# Patient Record
Sex: FEMALE | Race: BLACK OR AFRICAN AMERICAN | NOT HISPANIC OR LATINO
[De-identification: names, ages, dates, MRNs, and addresses within clinical notes are randomized per-mention and may not be internally consistent; named-entity substitution may affect disease eponyms.]

---

## 2021-09-09 ENCOUNTER — RESULT REVIEW (OUTPATIENT)
Age: 46
End: 2021-09-09

## 2022-09-14 PROBLEM — Z00.00 ENCOUNTER FOR PREVENTIVE HEALTH EXAMINATION: Status: ACTIVE | Noted: 2022-09-14

## 2022-09-15 ENCOUNTER — APPOINTMENT (OUTPATIENT)
Dept: ORTHOPEDIC SURGERY | Facility: CLINIC | Age: 47
End: 2022-09-15

## 2022-09-15 DIAGNOSIS — M32.9 SYSTEMIC LUPUS ERYTHEMATOSUS, UNSPECIFIED: ICD-10-CM

## 2022-09-15 DIAGNOSIS — Z86.79 PERSONAL HISTORY OF OTHER DISEASES OF THE CIRCULATORY SYSTEM: ICD-10-CM

## 2022-09-15 PROCEDURE — 99214 OFFICE O/P EST MOD 30 MIN: CPT

## 2022-09-15 RX ORDER — METOPROLOL TARTRATE 25 MG/1
25 TABLET, FILM COATED ORAL
Refills: 0 | Status: ACTIVE | COMMUNITY

## 2022-09-15 RX ORDER — ASPIRIN 325 MG/1
TABLET, FILM COATED ORAL
Refills: 0 | Status: ACTIVE | COMMUNITY

## 2022-09-15 RX ORDER — DAPSONE 25 MG/1
TABLET ORAL
Refills: 0 | Status: ACTIVE | COMMUNITY

## 2022-09-15 RX ORDER — PANTOPRAZOLE SODIUM 100 %
POWDER (GRAM) MISCELLANEOUS
Refills: 0 | Status: ACTIVE | COMMUNITY

## 2022-09-15 RX ORDER — RAMIPRIL 10 MG/1
10 CAPSULE ORAL
Refills: 0 | Status: ACTIVE | COMMUNITY

## 2022-09-15 RX ORDER — METHYLPREDNISOLONE 4 MG/1
4 TABLET ORAL
Qty: 1 | Refills: 0 | Status: ACTIVE | COMMUNITY
Start: 2022-09-15 | End: 1900-01-01

## 2022-09-15 NOTE — ASSESSMENT
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  The natural history of the pathology was explained to the patient in layman's terms.  Several different treatment options were discussed and explained including the risks and benefits of both surgical and non-surgical treatments.  All questions and concerns were answered.\par - We will conservative treatment with a course of PT and anti-inflammatory medication.\par - Rx given for Medrol dose pack\par - Discussed the possible side effects of medication along with the timing and frequency for taking.\par - MRI to rule out HNP and other causes of cervical radiculopathy\par - Follow up after mri with spine team\par

## 2022-09-15 NOTE — HISTORY OF PRESENT ILLNESS
[de-identified] : 47 year old female  ((RHD, social security ) chronic neck into right arm and shoulder pain worsening since Feb 2022. pain assoc with numbness a`nd tingling and weakness into hand. worse with activity and better with lifting arm. tried OTC meds, activity modficiaon, HEP.\par \par h/o CTS\par h/o lupus\par \par 9/15/22 - sent MDP and to get c spine mri and fu with spine back in march\par \par

## 2022-09-15 NOTE — IMAGING
[de-identified] : NECK:\par Inspection: no ecchymosis. \par Palpation: trapezial tenderness. \par Range of motion:  Full range of motion with mild stiffness . Pain at extremes of rotation to right. \par Strength Testing: Weakness with Right Finger Abductors and Grasp\par Normal Deltoid, Biceps, Triceps, Wrist Flexors\par Neurological testing: light touch is intact throughout both upper extremities\par Louis reflex: neg\par Spurling test: positive\par \par \par \par RIGHT SHOULDER\par Inspection: No swelling. \par Palpation: Tenderness is noted at the bicipital groove, anterior and lateral. \par Range of motion: There is pain with range of motion.\par , ER 55, @90ER 90, @90IR 30\par Strength: There is pain and discomfort with strength testing.\par Forward Flexion 4/5. Abduction 4/5.  External Rotation 5-/5 and Internal Rotation 5/5 \par Neurological testings: motor and sensor intact distally.\par Ligament Stability and Special Tests: \par There is positive arc of pain. \par Shoulder apprehension: neg\par Shoulder relocation: neg\par Egan’s test: pos\par Biceps Active test: neg\par Granado Labral Shear: neg\par Impingement testing: pos\par Heidy testing: pos\par Whipple: pos\par Cross Body Adduction: neg\par \par

## 2022-10-03 ENCOUNTER — APPOINTMENT (OUTPATIENT)
Dept: ORTHOPEDIC SURGERY | Facility: CLINIC | Age: 47
End: 2022-10-03

## 2022-10-03 PROCEDURE — 99214 OFFICE O/P EST MOD 30 MIN: CPT

## 2022-10-03 NOTE — DATA REVIEWED
[MRI] : MRI [Cervical Spine] : cervical spine [Report was reviewed and noted in the chart] : The report was reviewed and noted in the chart [I independently reviewed and interpreted images and report] : I independently reviewed and interpreted images and report [I reviewed the films/CD] : I reviewed the films/CD

## 2022-10-03 NOTE — ASSESSMENT
[FreeTextEntry1] : 47f with cervical radicular symptoms with HNP C3-4, C4-5 and C5-6 on MRI. Will get her started on physical therapy and recommended a consult with pain management for epidural injections.

## 2022-10-03 NOTE — HISTORY OF PRESENT ILLNESS
[de-identified] : 47 year old female  ((RHD, social security ) chronic neck into right arm and shoulder pain worsening since Feb 2022. pain assoc with numbness a`nd tingling and weakness into hand. worse with activity and better with lifting arm. tried OTC meds, activity modficiaon, HEP.\par \par h/o CTS\par h/o lupus\par \par 9/15/22 - sent MDP and to get c spine mri and fu with spine back in march\par \par 10/3/22: 46 y/o F sent by Dr. Banks for further evaluation of neck pain with right arm radiculopathy. Recently treated with MDP with partial relief. Had MRI stand up march 2022 that revealed mulitple hnps with R c5/C6 compression.  [FreeTextEntry1] : C Spine

## 2022-10-03 NOTE — HISTORY OF PRESENT ILLNESS
[de-identified] : 47 year old female  ((RHD, social security ) chronic neck into right arm and shoulder pain worsening since Feb 2022. pain assoc with numbness a`nd tingling and weakness into hand. worse with activity and better with lifting arm. tried OTC meds, activity modficiaon, HEP.\par \par h/o CTS\par h/o lupus\par \par 9/15/22 - sent MDP and to get c spine mri and fu with spine back in march\par \par 10/3/22: 46 y/o F sent by Dr. Banks for further evaluation of neck pain with right arm radiculopathy. Recently treated with MDP with partial relief. Had MRI stand up march 2022 that revealed mulitple hnps with R c5/C6 compression.  [FreeTextEntry1] : C Spine

## 2022-10-10 ENCOUNTER — APPOINTMENT (OUTPATIENT)
Dept: ORTHOPEDIC SURGERY | Facility: CLINIC | Age: 47
End: 2022-10-10

## 2022-10-13 ENCOUNTER — APPOINTMENT (OUTPATIENT)
Dept: PAIN MANAGEMENT | Facility: CLINIC | Age: 47
End: 2022-10-13

## 2022-10-13 VITALS — HEIGHT: 67 IN | WEIGHT: 203 LBS | BODY MASS INDEX: 31.86 KG/M2

## 2022-10-13 PROCEDURE — 99204 OFFICE O/P NEW MOD 45 MIN: CPT

## 2022-10-13 NOTE — REASON FOR VISIT
[Initial Consultation] : an initial pain management consultation [FreeTextEntry2] : neck pain radiating to right shoulder

## 2022-10-13 NOTE — CONSULT LETTER
[Dear  ___] : Dear  [unfilled], [Consult Letter:] : I had the pleasure of evaluating your patient, [unfilled]. [Please see my note below.] : Please see my note below. [Consult Closing:] : Thank you very much for allowing me to participate in the care of this patient.  If you have any questions, please do not hesitate to contact me. [Sincerely,] : Sincerely, [FreeTextEntry3] : Jean Paul Schaefer MD\par \par

## 2022-10-13 NOTE — HISTORY OF PRESENT ILLNESS
[Neck] : neck [10] : 10 [Dull/Aching] : dull/aching [Radiating] : radiating [Tightness] : tightness [Tingling] : tingling [Constant] : constant [Household chores] : household chores [Sleep] : sleep [Nothing helps with pain getting better] : Nothing helps with pain getting better [Sitting] : sitting [Standing] : standing [Walking] : walking [Bending forward] : bending forward [Exercising] : exercising [Stairs] : stairs [Lying in bed] : lying in bed [] : no [FreeTextEntry1] : rt shoulder  [FreeTextEntry6] : pins and needles  [FreeTextEntry7] : rt shoulders , arms and hands  [de-identified] : c mri

## 2022-10-13 NOTE — DISCUSSION/SUMMARY
[Surgical risks reviewed] : Surgical risks reviewed [de-identified] : After discussing various treatment options with the patient including but not limited to oral medications, physical therapy, exercise,\par modalities as well as interventional spinal injections, we have decided with the following plan\par \par I personally reviewed the MRI/CT scan images and agree with the radiologist's report. The\par radiological findings were discussed with the patient.\par \par \par The risks, benefits, contents and alternatives to injection were explained in full to the patient. Risks outlined include but are not\par limited to infection,sepsis, bleeding, post-dural puncture headache, nerve damage, temporary increase in pain, syncopal episode,\par failure to resolve symptoms, allergic reaction, symptom recurrence, and elevation of blood sugar in diabetics. Cortisone may cause\par immunosuppression. Patient understands the risks. All questions were answered. After discussion of options, patient requested\par an injection. Information regarding the injection was given to the patient. Which medications to stop prior to the injection was\par explained to the patient as well.\par \par Follow up in 1-2 weeks post injection for re-evaluation.\par \par  Conservative Care\par Continue Home exercises, stretching, activity modification, physical therapy, and conservative care.\par \par Proceed with C7- T1 RANI\par \par \par Proceed with C7-T1 RANI.

## 2022-11-07 ENCOUNTER — APPOINTMENT (OUTPATIENT)
Dept: ORTHOPEDIC SURGERY | Facility: CLINIC | Age: 47
End: 2022-11-07

## 2022-11-28 ENCOUNTER — APPOINTMENT (OUTPATIENT)
Age: 47
End: 2022-11-28

## 2022-11-28 PROCEDURE — 62321 NJX INTERLAMINAR CRV/THRC: CPT

## 2022-11-29 LAB — SARS-COV-2 N GENE NPH QL NAA+PROBE: NOT DETECTED

## 2022-12-15 ENCOUNTER — APPOINTMENT (OUTPATIENT)
Dept: PAIN MANAGEMENT | Facility: CLINIC | Age: 47
End: 2022-12-15

## 2022-12-15 VITALS — HEIGHT: 67 IN | WEIGHT: 203 LBS | BODY MASS INDEX: 31.86 KG/M2

## 2022-12-15 DIAGNOSIS — M54.12 RADICULOPATHY, CERVICAL REGION: ICD-10-CM

## 2022-12-15 DIAGNOSIS — M54.2 CERVICALGIA: ICD-10-CM

## 2022-12-15 PROCEDURE — 99214 OFFICE O/P EST MOD 30 MIN: CPT

## 2022-12-15 NOTE — ASSESSMENT
[FreeTextEntry1] : s/p C7-T1 RANI\par \par Pt reports 50-60% relief from injection #1, most of her relief is in RUE, R shoulder improved but some pain persists. Will proceed with RANI #2 and followup prn. Continue HEP.

## 2022-12-15 NOTE — DISCUSSION/SUMMARY
[Surgical risks reviewed] : Surgical risks reviewed [de-identified] : After discussing various treatment options with the patient including but not limited to oral medications, physical therapy, exercise,\par modalities as well as interventional spinal injections, we have decided with the following plan\par \par I personally reviewed the MRI/CT scan images and agree with the radiologist's report. The\par radiological findings were discussed with the patient.\par \par \par The risks, benefits, contents and alternatives to injection were explained in full to the patient. Risks outlined include but are not\par limited to infection,sepsis, bleeding, post-dural puncture headache, nerve damage, temporary increase in pain, syncopal episode,\par failure to resolve symptoms, allergic reaction, symptom recurrence, and elevation of blood sugar in diabetics. Cortisone may cause\par immunosuppression. Patient understands the risks. All questions were answered. After discussion of options, patient requested\par an injection. Information regarding the injection was given to the patient. Which medications to stop prior to the injection was\par explained to the patient as well.\par \par Follow up in 1-2 weeks post injection for re-evaluation.\par \par  Conservative Care\par Continue Home exercises, stretching, activity modification, physical therapy, and conservative care.\par \par \par \par Proceed with C7-T1 RANI. \par

## 2022-12-15 NOTE — HISTORY OF PRESENT ILLNESS
[5] : 5 [Tingling] : tingling [Constant] : constant [Nothing helps with pain getting better] : Nothing helps with pain getting better [de-identified] : Patient presents for follow up after RANI. Patient reports significant relief from the procedure, tingling in right shoulder still persists. [] : no [FreeTextEntry1] : Right shoulder pain [de-identified] : Nothing makes the pain any worse [FreeTextEntry7] : No longer radiating down full length of arm

## 2023-01-10 ENCOUNTER — APPOINTMENT (OUTPATIENT)
Dept: ORTHOPEDIC SURGERY | Facility: CLINIC | Age: 48
End: 2023-01-10
Payer: COMMERCIAL

## 2023-01-10 VITALS — HEIGHT: 67 IN | BODY MASS INDEX: 31.86 KG/M2 | WEIGHT: 203 LBS

## 2023-01-10 VITALS — BODY MASS INDEX: 30.45 KG/M2 | WEIGHT: 194 LBS | HEIGHT: 67 IN

## 2023-01-10 DIAGNOSIS — M76.891 OTHER SPECIFIED ENTHESOPATHIES OF RIGHT LOWER LIMB, EXCLUDING FOOT: ICD-10-CM

## 2023-01-10 PROCEDURE — 72100 X-RAY EXAM L-S SPINE 2/3 VWS: CPT

## 2023-01-10 PROCEDURE — 99214 OFFICE O/P EST MOD 30 MIN: CPT

## 2023-01-10 PROCEDURE — 72170 X-RAY EXAM OF PELVIS: CPT

## 2023-01-10 NOTE — ASSESSMENT
[FreeTextEntry1] : 47 year F with right hip flexor tendonitis\par - physical therapy, cannot take NSAIDs\par - Return in 6 weeks for follow up PRN

## 2023-01-10 NOTE — HISTORY OF PRESENT ILLNESS
[7] : 7 [6] : 6 [Dull/Aching] : dull/aching [Radiating] : radiating [Constant] : constant [Sleep] : sleep [Meds] : meds [Standing] : standing [Walking] : walking [Full time] : Work status: full time [de-identified] : 01/10/2023 Ms. JESSE VACA F  here for eval of the Rt hip. Patient stated her right hip has been giving her trouble for about two months; she was given a cortisone shot; which she stated did not help. Patient states the pain travels into the groin. No known injury. No pain down the leg. No numbness or tingling. Has not done PT.  Cannot take NSAIDs due to gastric sleeve sx. Takes tylenol arthritis for pain. \par \par PMH: Lupus on plaquenil  [] : no [FreeTextEntry1] : right hip  [FreeTextEntry7] : right groin  [FreeTextEntry9] : Tylenol

## 2023-01-13 ENCOUNTER — APPOINTMENT (OUTPATIENT)
Dept: PAIN MANAGEMENT | Facility: CLINIC | Age: 48
End: 2023-01-13
Payer: COMMERCIAL

## 2023-01-13 PROCEDURE — 62321 NJX INTERLAMINAR CRV/THRC: CPT

## 2023-01-13 NOTE — PROCEDURE
[FreeTextEntry3] : Date of Service: 01/13/2023 \par \par Account: [id number] \par \par Patient: JESSE VACA \par \par YOB: 1975 \par \par Age: 47 year \par \par Surgeon:                                                      Jean Paul Schaefer M.D.\par \par Pre-Operative Diagnosis:                         Cervical Radiculopathy (M54.12) \par \par Post Operative Diagnosis:                       Cervical Radiculopathy (M54.12)\par \par Procedure:                                                  Interlaminar cervical epidural steroid injection (C7-T1) under fluoroscopic guidance\par \par Anesthesia:                                                 MAC\par \par \par This procedure was carried out using fluoroscopic guidance.  The risks and benefits of the procedure were discussed extensively with the patient.  The consent of the patient was obtained and the following procedure was performed.\par \par The patient was placed in the prone position using a thoracic and chin support.  The cervical area was prepped and draped in a sterile fashion.  The fluoroscope visualized the C7-T1 interspace using slight cephalad-caudad angulation and this area was marked.  Using sterile technique the superficial skin was anesthetized with 1% Lidocaine without epinephrine.  A 18 gauge Tuohoy needle was advanced under fluoroscopy using [id number] technique until ligament was engaged.  The stilette was then removed and a column of preservative free normal saline flushed throught the tuohoy needle and left with a concave fluid level above the butterfly portion of the tuohoy needle.  The needle was then advanced under fluoroscopic guidance until the column of saline disappeared.  Lateral view confirmed final needle tip placement in the epidural space.  After negative aspiration for heme and CSF, 1 cc of Omnipaque confirmed good cervical epiduragram.  \par \par Cervical epidurogram showed no evidence of intrathecal or intravascular flow, and good bilateral epidural flow from C3 to T2 levels.  An injectate of 6 cc of preservative free normal saline plus 12 mg of betamethasone was then injected into the epidural space. The needle was subsequently removed and pressure was applied.\par \par Anesthesia personnel were present throughout the procedure.  The patient tolerated the procedure well and was instructed to contact me immediately if there were any problems.\par \par Jean Paul Schaefer M.D.\par

## 2023-01-26 ENCOUNTER — APPOINTMENT (OUTPATIENT)
Dept: PAIN MANAGEMENT | Facility: CLINIC | Age: 48
End: 2023-01-26

## 2024-02-15 ENCOUNTER — APPOINTMENT (OUTPATIENT)
Dept: ORTHOPEDIC SURGERY | Facility: CLINIC | Age: 49
End: 2024-02-15
Payer: COMMERCIAL

## 2024-02-15 VITALS — HEIGHT: 67 IN | BODY MASS INDEX: 30.45 KG/M2 | WEIGHT: 194 LBS

## 2024-02-15 DIAGNOSIS — M54.12 RADICULOPATHY, CERVICAL REGION: ICD-10-CM

## 2024-02-15 DIAGNOSIS — M50.20 OTHER CERVICAL DISC DISPLACEMENT, UNSPECIFIED CERVICAL REGION: ICD-10-CM

## 2024-02-15 DIAGNOSIS — M50.90 CERVICAL DISC DISORDER, UNSPECIFIED, UNSPECIFIED CERVICAL REGION: ICD-10-CM

## 2024-02-15 DIAGNOSIS — M75.41 IMPINGEMENT SYNDROME OF RIGHT SHOULDER: ICD-10-CM

## 2024-02-15 PROCEDURE — 73030 X-RAY EXAM OF SHOULDER: CPT | Mod: RT

## 2024-02-15 PROCEDURE — 99214 OFFICE O/P EST MOD 30 MIN: CPT

## 2024-02-15 PROCEDURE — 72040 X-RAY EXAM NECK SPINE 2-3 VW: CPT

## 2024-02-15 PROCEDURE — 73010 X-RAY EXAM OF SHOULDER BLADE: CPT | Mod: RT

## 2024-02-15 RX ORDER — METHYLPREDNISOLONE 4 MG/1
4 TABLET ORAL
Qty: 1 | Refills: 0 | Status: ACTIVE | COMMUNITY
Start: 2024-02-15 | End: 1900-01-01

## 2024-02-15 NOTE — ASSESSMENT
[FreeTextEntry1] : mri c spine stand up 3/22/22 - mult HNP with R C5 and C6 N compresison  Right X-Ray Examination of the SHOULDER (2 views):  no fractures, subluxations or dislocations.  X-Ray Examination of the SCAPULA 1 or 2 views shows: no significant abnormalities X-Ray Examination of the CERVICAL SPINE 3 views (or less) shows: straightening consistent with spasm and disc space narrowing.   - We discussed their diagnosis and treatment options at length including the risks and benefits of both surgical and non-surgical options. - We will conservative treatment with a course of PT and anti-inflammatory medication. - Rx given for Medrol dose pack - Discussed the possible side effects of medication along with the timing and frequency for taking. - If they do not make an appropriate improvement with therapy we discussed that we will obtain an updated MRI at their next visit.

## 2024-02-15 NOTE — HISTORY OF PRESENT ILLNESS
[de-identified] : 48 year old female  ((RHD, social security ) chronic neck into right arm and shoulder pain worsening since Feb 2022. pain assoc with numbness a`nd tingling and weakness into hand. worse with activity and better with lifting arm. tried OTC meds, activity modficiaon, HEP.  h/o CTS h/o lupus  9/15/22 - sent MDP and to get c spine mri and fu with spine back in march  2/15/24- pain returns in her neck and shoulder worsening  radition into arm and shouldert blade and arm

## 2024-02-15 NOTE — IMAGING
[de-identified] : NECK: Inspection: no ecchymosis.  Palpation: trapezial tenderness.  Range of motion:  Full range of motion with mild stiffness . Pain at extremes of rotation to right.  Strength Testing: Weakness with Right Finger Abductors and Grasp Normal Deltoid, Biceps, Triceps, Wrist Flexors Neurological testing: light touch is intact throughout both upper extremities Louis reflex: neg Spurling test: positive    RIGHT SHOULDER Inspection: No swelling.  Palpation: Tenderness is noted at the bicipital groove, anterior and lateral.  Range of motion: There is pain with range of motion. , ER 55, @90ER 90, @90IR 30 Strength: There is pain and discomfort with strength testing. Forward Flexion 4/5. Abduction 4/5.  External Rotation 5-/5 and Internal Rotation 5/5  Neurological testings: motor and sensor intact distally. Ligament Stability and Special Tests:  There is positive arc of pain.  Shoulder apprehension: neg Shoulder relocation: neg Egan's test: pos Biceps Active test: neg Granado Labral Shear: neg Impingement testing: pos Heidy testing: pos Whipple: pos Cross Body Adduction: neg

## 2024-07-14 PROBLEM — S93.601S FOOT SPRAIN, RIGHT, SEQUELA: Status: ACTIVE | Noted: 2024-07-14

## 2024-07-15 ENCOUNTER — APPOINTMENT (OUTPATIENT)
Dept: MRI IMAGING | Facility: CLINIC | Age: 49
End: 2024-07-15
Payer: COMMERCIAL

## 2024-07-15 PROCEDURE — 73718 MRI LOWER EXTREMITY W/O DYE: CPT | Mod: RT

## 2024-07-16 ENCOUNTER — TRANSCRIPTION ENCOUNTER (OUTPATIENT)
Age: 49
End: 2024-07-16

## 2024-07-16 ENCOUNTER — APPOINTMENT (OUTPATIENT)
Dept: ORTHOPEDIC SURGERY | Facility: CLINIC | Age: 49
End: 2024-07-16
Payer: COMMERCIAL

## 2024-07-16 VITALS — HEIGHT: 67 IN | WEIGHT: 162 LBS | BODY MASS INDEX: 25.43 KG/M2

## 2024-07-16 DIAGNOSIS — Z00.00 ENCOUNTER FOR GENERAL ADULT MEDICAL EXAMINATION W/OUT ABNORMAL FINDINGS: ICD-10-CM

## 2024-07-16 DIAGNOSIS — S93.321A: ICD-10-CM

## 2024-07-16 PROCEDURE — 99204 OFFICE O/P NEW MOD 45 MIN: CPT

## 2024-07-16 PROCEDURE — 73630 X-RAY EXAM OF FOOT: CPT | Mod: RT

## 2024-07-17 ENCOUNTER — APPOINTMENT (OUTPATIENT)
Dept: ORTHOPEDIC SURGERY | Facility: CLINIC | Age: 49
End: 2024-07-17

## 2024-07-25 ENCOUNTER — TRANSCRIPTION ENCOUNTER (OUTPATIENT)
Age: 49
End: 2024-07-25

## 2024-07-26 ENCOUNTER — APPOINTMENT (OUTPATIENT)
Dept: ORTHOPEDIC SURGERY | Facility: HOSPITAL | Age: 49
End: 2024-07-26
Payer: COMMERCIAL

## 2024-07-26 ENCOUNTER — TRANSCRIPTION ENCOUNTER (OUTPATIENT)
Age: 49
End: 2024-07-26

## 2024-07-26 PROCEDURE — 73630 X-RAY EXAM OF FOOT: CPT | Mod: 26,RT

## 2024-07-26 PROCEDURE — 28615 REPAIR FOOT DISLOCATION: CPT | Mod: RT

## 2024-07-26 PROCEDURE — 28555 REPAIR FOOT DISLOCATION: CPT | Mod: 59,RT

## 2024-07-26 PROCEDURE — 29515 APPLICATION SHORT LEG SPLINT: CPT | Mod: 59,RT

## 2024-08-05 ENCOUNTER — APPOINTMENT (OUTPATIENT)
Dept: ORTHOPEDIC SURGERY | Facility: CLINIC | Age: 49
End: 2024-08-05

## 2024-08-05 PROBLEM — Z87.81 PERSONAL HISTORY OF (HEALED) TRAUMATIC FRACTURE: Chronic | Status: ACTIVE | Noted: 2024-07-22

## 2024-08-05 PROBLEM — J30.2 OTHER SEASONAL ALLERGIC RHINITIS: Chronic | Status: ACTIVE | Noted: 2024-07-22

## 2024-08-05 PROBLEM — I25.10 ATHEROSCLEROTIC HEART DISEASE OF NATIVE CORONARY ARTERY WITHOUT ANGINA PECTORIS: Chronic | Status: ACTIVE | Noted: 2024-07-22

## 2024-08-05 PROBLEM — M06.9 RHEUMATOID ARTHRITIS, UNSPECIFIED: Chronic | Status: ACTIVE | Noted: 2024-07-22

## 2024-08-05 PROBLEM — J45.909 UNSPECIFIED ASTHMA, UNCOMPLICATED: Chronic | Status: ACTIVE | Noted: 2024-07-22

## 2024-08-05 PROBLEM — N80.9 ENDOMETRIOSIS, UNSPECIFIED: Chronic | Status: ACTIVE | Noted: 2024-07-22

## 2024-08-05 PROBLEM — Q24.5 MALFORMATION OF CORONARY VESSELS: Chronic | Status: ACTIVE | Noted: 2024-07-22

## 2024-08-05 PROBLEM — M32.9 SYSTEMIC LUPUS ERYTHEMATOSUS, UNSPECIFIED: Chronic | Status: ACTIVE | Noted: 2024-07-22

## 2024-08-05 PROBLEM — U07.1 COVID-19: Chronic | Status: ACTIVE | Noted: 2024-07-22

## 2024-08-05 PROCEDURE — 99024 POSTOP FOLLOW-UP VISIT: CPT

## 2024-08-05 NOTE — HISTORY OF PRESENT ILLNESS
[7] : 7 [2] : 2 [Dull/Aching] : dull/aching [Sharp] : sharp [Throbbing] : throbbing [de-identified] : 07/16/2024:  fall on cruise 6 days ago  with foot pain.  placed in splint by ship personnel. went to [facility] and given boot and sent for mri. prior brostrum by dr solis about 10 years -- denies post op issues. denies dm/tob. +SLE on plaquenil. h/o cabg on asa 81. .  07/26/2024:  R foot ORIF Lisfranc  08/05/2024:  no complaints. nwb in splint. taking asa. Patient denies fevers, chills, or drainage. [] : no [FreeTextEntry1] : right foot [de-identified] : boot [de-identified] : [facility] [de-identified] : xr, MRI

## 2024-08-05 NOTE — PHYSICAL EXAM
[Right] : right foot and ankle [5___] : plantar flexion 5[unfilled]/5 [2+] : dorsalis pedis pulse: 2+ [] : Sensation present to light touch in all distributions [de-identified] : rollator

## 2024-08-19 ENCOUNTER — APPOINTMENT (OUTPATIENT)
Dept: ORTHOPEDIC SURGERY | Facility: CLINIC | Age: 49
End: 2024-08-19
Payer: COMMERCIAL

## 2024-08-19 DIAGNOSIS — S93.321D: ICD-10-CM

## 2024-08-19 PROCEDURE — 99024 POSTOP FOLLOW-UP VISIT: CPT

## 2024-08-19 PROCEDURE — 73630 X-RAY EXAM OF FOOT: CPT | Mod: RT

## 2024-08-19 NOTE — HISTORY OF PRESENT ILLNESS
[7] : 7 [2] : 2 [Dull/Aching] : dull/aching [Sharp] : sharp [Throbbing] : throbbing [de-identified] : 07/16/2024:  fall on cruise 6 days ago  with foot pain.  placed in splint by ship personnel. went to [facility] and given boot and sent for mri. prior brostrum by dr solis about 10 years -- denies post op issues. denies dm/tob. +SLE on plaquenil. h/o cabg on asa 81. .  07/26/2024:  R foot ORIF Lisfranc  08/05/2024:  no complaints. nwb in splint. taking asa. Patient denies fevers, chills, or drainage.  8/19/2024: no new issues. taking asa. reports walking on foot at times. Patient denies fevers, chills, or drainage. [] : no [FreeTextEntry1] : right foot [de-identified] : boot [de-identified] : [facility] [de-identified] : xr, MRI

## 2024-08-19 NOTE — PHYSICAL EXAM
[Right] : right foot and ankle [5___] : plantar flexion 5[unfilled]/5 [2+] : dorsalis pedis pulse: 2+ [] : no calf tenderness [de-identified] : rollator

## 2024-08-19 NOTE — ASSESSMENT
[FreeTextEntry1] : nwb--reiterated precautions. discussed if any further displacement due to weight bearing, would need to consider revision. patient states understands that foot should not touch floor cam boot discussed wound care f/up 2 wks w/ foot xray

## 2024-09-05 ENCOUNTER — APPOINTMENT (OUTPATIENT)
Dept: ORTHOPEDIC SURGERY | Facility: CLINIC | Age: 49
End: 2024-09-05
Payer: COMMERCIAL

## 2024-09-05 DIAGNOSIS — S93.321D: ICD-10-CM

## 2024-09-05 PROCEDURE — 73630 X-RAY EXAM OF FOOT: CPT | Mod: RT

## 2024-09-05 PROCEDURE — 99024 POSTOP FOLLOW-UP VISIT: CPT

## 2024-09-05 NOTE — ASSESSMENT
[FreeTextEntry1] : nwb--reiterated precautions. discussed if any further displacement due to weight bearing, would need to consider revision. patient states understands that foot should not touch floor -- no further displacement from last visit cam boot f/up 2 wks w/ foot xray

## 2024-09-05 NOTE — IMAGING
[Right] : right foot [de-identified] : some backing out of screws w/ mild recurrent displacement--unchanged from prior

## 2024-09-05 NOTE — PHYSICAL EXAM
[Right] : right foot and ankle [5___] : plantar flexion 5[unfilled]/5 [2+] : dorsalis pedis pulse: 2+ [] : no calf tenderness [de-identified] : rollator

## 2024-09-05 NOTE — HISTORY OF PRESENT ILLNESS
[7] : 7 [2] : 2 [Dull/Aching] : dull/aching [Sharp] : sharp [Throbbing] : throbbing [de-identified] : 07/16/2024:  fall on cruise 6 days ago  with foot pain.  placed in splint by ship personnel. went to [facility] and given boot and sent for mri. prior brostrum by dr solis about 10 years -- denies post op issues. denies dm/tob. +SLE on plaquenil. h/o cabg on asa 81. .  07/26/2024:  R foot ORIF Lisfranc  08/05/2024:  no complaints. nwb in splint. taking asa. Patient denies fevers, chills, or drainage.  8/19/2024: no new issues. taking asa. reports walking on foot at times. Patient denies fevers, chills, or drainage.  09/05/2024: improving. NWB in boot w/ scooter. Patient denies fevers, chills, or drainage. [] : no [FreeTextEntry1] : right foot [de-identified] : boot [de-identified] : [facility] [de-identified] : xr, MRI

## 2024-09-23 ENCOUNTER — APPOINTMENT (OUTPATIENT)
Dept: ORTHOPEDIC SURGERY | Facility: CLINIC | Age: 49
End: 2024-09-23
Payer: COMMERCIAL

## 2024-09-23 DIAGNOSIS — S93.321D: ICD-10-CM

## 2024-09-23 PROCEDURE — 73630 X-RAY EXAM OF FOOT: CPT | Mod: RT

## 2024-09-23 PROCEDURE — 99024 POSTOP FOLLOW-UP VISIT: CPT

## 2024-09-23 NOTE — PHYSICAL EXAM
[Right] : right foot and ankle [5___] : plantar flexion 5[unfilled]/5 [2+] : dorsalis pedis pulse: 2+ [] : no sero-sanguinous drainage [de-identified] : rollator

## 2024-09-23 NOTE — IMAGING
[Right] : right foot [de-identified] : some backing out of screws w/ mild recurrent displacement--unchanged from prior

## 2024-09-23 NOTE — HISTORY OF PRESENT ILLNESS
[7] : 7 [2] : 2 [Dull/Aching] : dull/aching [Sharp] : sharp [Throbbing] : throbbing [de-identified] : 07/16/2024:  fall on cruise 6 days ago  with foot pain.  placed in splint by ship personnel. went to [facility] and given boot and sent for mri. prior brostrum by dr solis about 10 years -- denies post op issues. denies dm/tob. +SLE on plaquenil. h/o cabg on asa 81. .  07/26/2024:  R foot ORIF Lisfranc  08/05/2024:  no complaints. nwb in splint. taking asa. Patient denies fevers, chills, or drainage.  8/19/2024: no new issues. taking asa. reports walking on foot at times. Patient denies fevers, chills, or drainage.  09/05/2024: improving. NWB in boot w/ scooter. Patient denies fevers, chills, or drainage.  09/23/2024:  some pain at end of day. nwb in boot. Patient denies fevers, chills, or drainage. [] : no [FreeTextEntry1] : right foot [de-identified] : boot [de-identified] : [facility] [de-identified] : xr, MRI

## 2024-09-23 NOTE — ASSESSMENT
[FreeTextEntry1] : protected wb in cam boot may begin to partial wb in boot ice/elevate nsaids prn f/up 3 wks w/ foot xray

## 2024-10-14 ENCOUNTER — APPOINTMENT (OUTPATIENT)
Dept: ORTHOPEDIC SURGERY | Facility: CLINIC | Age: 49
End: 2024-10-14
Payer: COMMERCIAL

## 2024-10-14 DIAGNOSIS — S93.321D: ICD-10-CM

## 2024-10-14 PROCEDURE — 73630 X-RAY EXAM OF FOOT: CPT | Mod: RT

## 2024-10-14 PROCEDURE — 99024 POSTOP FOLLOW-UP VISIT: CPT

## 2024-11-11 ENCOUNTER — APPOINTMENT (OUTPATIENT)
Dept: ORTHOPEDIC SURGERY | Facility: CLINIC | Age: 49
End: 2024-11-11
Payer: COMMERCIAL

## 2024-11-11 DIAGNOSIS — S93.321D: ICD-10-CM

## 2024-11-11 PROCEDURE — 73630 X-RAY EXAM OF FOOT: CPT | Mod: RT

## 2024-11-11 PROCEDURE — 99213 OFFICE O/P EST LOW 20 MIN: CPT

## 2024-12-16 ENCOUNTER — APPOINTMENT (OUTPATIENT)
Dept: ORTHOPEDIC SURGERY | Facility: CLINIC | Age: 49
End: 2024-12-16
Payer: COMMERCIAL

## 2024-12-16 DIAGNOSIS — S93.321D: ICD-10-CM

## 2024-12-16 PROCEDURE — 73630 X-RAY EXAM OF FOOT: CPT | Mod: RT

## 2024-12-16 PROCEDURE — 99214 OFFICE O/P EST MOD 30 MIN: CPT

## 2024-12-23 PROBLEM — R56.9 UNSPECIFIED CONVULSIONS: Chronic | Status: ACTIVE | Noted: 2024-07-22

## 2025-01-09 ENCOUNTER — OUTPATIENT (OUTPATIENT)
Dept: OUTPATIENT SERVICES | Facility: HOSPITAL | Age: 50
LOS: 1 days | End: 2025-01-09
Payer: COMMERCIAL

## 2025-01-09 VITALS
HEART RATE: 72 BPM | SYSTOLIC BLOOD PRESSURE: 133 MMHG | DIASTOLIC BLOOD PRESSURE: 84 MMHG | OXYGEN SATURATION: 98 % | TEMPERATURE: 98 F | HEIGHT: 66 IN | RESPIRATION RATE: 14 BRPM | WEIGHT: 169.98 LBS

## 2025-01-09 DIAGNOSIS — S93.321D SUBLUXATION OF TARSOMETATARSAL JOINT OF RIGHT FOOT, SUBSEQUENT ENCOUNTER: ICD-10-CM

## 2025-01-09 DIAGNOSIS — Z98.84 BARIATRIC SURGERY STATUS: Chronic | ICD-10-CM

## 2025-01-09 DIAGNOSIS — M50.20 OTHER CERVICAL DISC DISPLACEMENT, UNSPECIFIED CERVICAL REGION: Chronic | ICD-10-CM

## 2025-01-09 DIAGNOSIS — Z82.49 FAMILY HISTORY OF ISCHEMIC HEART DISEASE AND OTHER DISEASES OF THE CIRCULATORY SYSTEM: Chronic | ICD-10-CM

## 2025-01-09 DIAGNOSIS — M79.671 PAIN IN RIGHT FOOT: ICD-10-CM

## 2025-01-09 DIAGNOSIS — Z96.22 MYRINGOTOMY TUBE(S) STATUS: Chronic | ICD-10-CM

## 2025-01-09 DIAGNOSIS — Z90.89 ACQUIRED ABSENCE OF OTHER ORGANS: Chronic | ICD-10-CM

## 2025-01-09 DIAGNOSIS — Z98.890 OTHER SPECIFIED POSTPROCEDURAL STATES: Chronic | ICD-10-CM

## 2025-01-09 DIAGNOSIS — D64.9 ANEMIA, UNSPECIFIED: Chronic | ICD-10-CM

## 2025-01-09 DIAGNOSIS — Z87.74 PERSONAL HISTORY OF (CORRECTED) CONGENITAL MALFORMATIONS OF HEART AND CIRCULATORY SYSTEM: Chronic | ICD-10-CM

## 2025-01-09 DIAGNOSIS — Z92.241 PERSONAL HISTORY OF SYSTEMIC STEROID THERAPY: Chronic | ICD-10-CM

## 2025-01-09 DIAGNOSIS — Z01.818 ENCOUNTER FOR OTHER PREPROCEDURAL EXAMINATION: ICD-10-CM

## 2025-01-09 DIAGNOSIS — Z98.891 HISTORY OF UTERINE SCAR FROM PREVIOUS SURGERY: Chronic | ICD-10-CM

## 2025-01-09 PROBLEM — R56.9 UNSPECIFIED CONVULSIONS: Chronic | Status: INACTIVE | Noted: 2024-07-22 | Resolved: 2025-01-09

## 2025-01-09 LAB
ALBUMIN SERPL ELPH-MCNC: 3.1 G/DL — LOW (ref 3.3–5)
ALP SERPL-CCNC: 49 U/L — SIGNIFICANT CHANGE UP (ref 30–120)
ALT FLD-CCNC: 13 U/L — SIGNIFICANT CHANGE UP (ref 10–60)
ANION GAP SERPL CALC-SCNC: 6 MMOL/L — SIGNIFICANT CHANGE UP (ref 5–17)
AST SERPL-CCNC: 17 U/L — SIGNIFICANT CHANGE UP (ref 10–40)
BILIRUB SERPL-MCNC: 0.4 MG/DL — SIGNIFICANT CHANGE UP (ref 0.2–1.2)
BUN SERPL-MCNC: 9 MG/DL — SIGNIFICANT CHANGE UP (ref 7–23)
CALCIUM SERPL-MCNC: 8.9 MG/DL — SIGNIFICANT CHANGE UP (ref 8.4–10.5)
CHLORIDE SERPL-SCNC: 107 MMOL/L — SIGNIFICANT CHANGE UP (ref 96–108)
CO2 SERPL-SCNC: 29 MMOL/L — SIGNIFICANT CHANGE UP (ref 22–31)
CREAT SERPL-MCNC: 0.67 MG/DL — SIGNIFICANT CHANGE UP (ref 0.5–1.3)
EGFR: 107 ML/MIN/1.73M2 — SIGNIFICANT CHANGE UP
GLUCOSE SERPL-MCNC: 86 MG/DL — SIGNIFICANT CHANGE UP (ref 70–99)
HCT VFR BLD CALC: 34.6 % — SIGNIFICANT CHANGE UP (ref 34.5–45)
HGB BLD-MCNC: 10.9 G/DL — LOW (ref 11.5–15.5)
MCHC RBC-ENTMCNC: 23.7 PG — LOW (ref 27–34)
MCHC RBC-ENTMCNC: 31.5 G/DL — LOW (ref 32–36)
MCV RBC AUTO: 75.2 FL — LOW (ref 80–100)
NRBC # BLD: 0 /100 WBCS — SIGNIFICANT CHANGE UP (ref 0–0)
PLATELET # BLD AUTO: 217 K/UL — SIGNIFICANT CHANGE UP (ref 150–400)
POTASSIUM SERPL-MCNC: 4.3 MMOL/L — SIGNIFICANT CHANGE UP (ref 3.5–5.3)
POTASSIUM SERPL-SCNC: 4.3 MMOL/L — SIGNIFICANT CHANGE UP (ref 3.5–5.3)
PROT SERPL-MCNC: 7.3 G/DL — SIGNIFICANT CHANGE UP (ref 6–8.3)
RBC # BLD: 4.6 M/UL — SIGNIFICANT CHANGE UP (ref 3.8–5.2)
RBC # FLD: 14.6 % — HIGH (ref 10.3–14.5)
SODIUM SERPL-SCNC: 142 MMOL/L — SIGNIFICANT CHANGE UP (ref 135–145)
WBC # BLD: 2.21 K/UL — LOW (ref 3.8–10.5)
WBC # FLD AUTO: 2.21 K/UL — LOW (ref 3.8–10.5)

## 2025-01-09 PROCEDURE — 36415 COLL VENOUS BLD VENIPUNCTURE: CPT

## 2025-01-09 PROCEDURE — 85027 COMPLETE CBC AUTOMATED: CPT

## 2025-01-09 PROCEDURE — G0463: CPT

## 2025-01-09 PROCEDURE — 80053 COMPREHEN METABOLIC PANEL: CPT

## 2025-01-09 NOTE — H&P PST ADULT - NSICDXPROCEDURE_GEN_ALL_CORE_FT
PROCEDURES:  Removal of hardware from metatarsal bone of right foot 09-Jan-2025 07:19:30  Salma Araujo

## 2025-01-09 NOTE — H&P PST ADULT - NSICDXFAMILYHX_GEN_ALL_CORE_FT
FAMILY HISTORY:  Mother  Still living? No  Family history of lymphoma, Age at diagnosis: Age Unknown  FH: melanoma, Age at diagnosis: Age Unknown

## 2025-01-09 NOTE — H&P PST ADULT - NSICDXPASTMEDICALHX_GEN_ALL_CORE_FT
PAST MEDICAL HISTORY:  2019 novel coronavirus disease (COVID-19)     Asthma     Bursitis of right shoulder     CAD (coronary artery disease)     Coronary artery anomaly, congenital     COVID-19 vaccine series completed     Endometriosis     Fracture of right foot     History of COVID-19     History of foot fracture     History of seizure     Migraines     Rheumatoid arthritis     Seasonal allergies     Systemic lupus erythematosus     Vertigo

## 2025-01-09 NOTE — H&P PST ADULT - NSANTHOSAYNRD_GEN_A_CORE
neck 13 inches/No. ONEAL screening performed.  STOP BANG Legend: 0-2 = LOW Risk; 3-4 = INTERMEDIATE Risk; 5-8 = HIGH Risk

## 2025-01-09 NOTE — H&P PST ADULT - HISTORY OF PRESENT ILLNESS
50 yo female presents s/p ORIF of right "foot fractures" in July 2024 with reports of continued pain in the right foot. Pain 5/10 at rest and increased to 9/10 with activity. Pain relieved with tylenol arthritis.

## 2025-01-09 NOTE — H&P PST ADULT - COMMENTS
history of covid 12/20 flu like symptoms and no treatment  denies any current cold or flu like symptoms, including fever, cough, sinus congestion, body aches or chills  history of seizure > 5 years ago

## 2025-01-09 NOTE — H&P PST ADULT - PROBLEM SELECTOR PLAN 1
right foot removal of hardware. cardiac clearance requested. surgical wash instructions reviewed and verbalized understanding. instructed to stop MVI 1 week prior to surgery. NPO after midnight

## 2025-01-09 NOTE — H&P PST ADULT - NSICDXPASTSURGICALHX_GEN_ALL_CORE_FT
PAST SURGICAL HISTORY:  FH: CABG (coronary artery bypass surgery)     H/O adenoidectomy     H/O aortic valve repair     Herniated cervical disc     History of ankle surgery     History of laparoscopic cholecystectomy     History of open reduction and internal fixation (ORIF) procedure     History of repaired congenital cardiovascular anomaly     History of right breast biopsy     Mild anemia     Myringotomy tube(s) status     S/P      S/P epidural steroid injection     S/P laparoscopic sleeve gastrectomy     S/P tonsillectomy

## 2025-01-09 NOTE — H&P PST ADULT - MUSCULOSKELETAL
right foot/no joint swelling/no joint erythema/no joint warmth/no calf tenderness/decreased ROM/decreased ROM due to pain/strength 5/5 bilateral upper extremities/decreased strength details…

## 2025-01-17 ENCOUNTER — RESULT REVIEW (OUTPATIENT)
Age: 50
End: 2025-01-17

## 2025-01-17 ENCOUNTER — TRANSCRIPTION ENCOUNTER (OUTPATIENT)
Age: 50
End: 2025-01-17

## 2025-01-17 ENCOUNTER — APPOINTMENT (OUTPATIENT)
Dept: ORTHOPEDIC SURGERY | Facility: HOSPITAL | Age: 50
End: 2025-01-17

## 2025-01-17 ENCOUNTER — OUTPATIENT (OUTPATIENT)
Dept: OUTPATIENT SERVICES | Facility: HOSPITAL | Age: 50
LOS: 1 days | End: 2025-01-17
Payer: COMMERCIAL

## 2025-01-17 VITALS
DIASTOLIC BLOOD PRESSURE: 67 MMHG | OXYGEN SATURATION: 100 % | SYSTOLIC BLOOD PRESSURE: 115 MMHG | HEART RATE: 64 BPM | RESPIRATION RATE: 13 BRPM

## 2025-01-17 VITALS
TEMPERATURE: 97 F | HEIGHT: 67 IN | HEART RATE: 75 BPM | OXYGEN SATURATION: 100 % | WEIGHT: 166.67 LBS | RESPIRATION RATE: 16 BRPM | DIASTOLIC BLOOD PRESSURE: 77 MMHG | SYSTOLIC BLOOD PRESSURE: 111 MMHG

## 2025-01-17 DIAGNOSIS — Z90.89 ACQUIRED ABSENCE OF OTHER ORGANS: Chronic | ICD-10-CM

## 2025-01-17 DIAGNOSIS — Z98.890 OTHER SPECIFIED POSTPROCEDURAL STATES: Chronic | ICD-10-CM

## 2025-01-17 DIAGNOSIS — Z92.241 PERSONAL HISTORY OF SYSTEMIC STEROID THERAPY: Chronic | ICD-10-CM

## 2025-01-17 DIAGNOSIS — Z01.818 ENCOUNTER FOR OTHER PREPROCEDURAL EXAMINATION: ICD-10-CM

## 2025-01-17 DIAGNOSIS — M50.20 OTHER CERVICAL DISC DISPLACEMENT, UNSPECIFIED CERVICAL REGION: Chronic | ICD-10-CM

## 2025-01-17 DIAGNOSIS — D64.9 ANEMIA, UNSPECIFIED: Chronic | ICD-10-CM

## 2025-01-17 DIAGNOSIS — Z96.22 MYRINGOTOMY TUBE(S) STATUS: Chronic | ICD-10-CM

## 2025-01-17 DIAGNOSIS — S93.321D SUBLUXATION OF TARSOMETATARSAL JOINT OF RIGHT FOOT, SUBSEQUENT ENCOUNTER: ICD-10-CM

## 2025-01-17 DIAGNOSIS — Z98.891 HISTORY OF UTERINE SCAR FROM PREVIOUS SURGERY: Chronic | ICD-10-CM

## 2025-01-17 DIAGNOSIS — Z87.74 PERSONAL HISTORY OF (CORRECTED) CONGENITAL MALFORMATIONS OF HEART AND CIRCULATORY SYSTEM: Chronic | ICD-10-CM

## 2025-01-17 DIAGNOSIS — Z98.84 BARIATRIC SURGERY STATUS: Chronic | ICD-10-CM

## 2025-01-17 DIAGNOSIS — Z90.49 ACQUIRED ABSENCE OF OTHER SPECIFIED PARTS OF DIGESTIVE TRACT: Chronic | ICD-10-CM

## 2025-01-17 DIAGNOSIS — Z82.49 FAMILY HISTORY OF ISCHEMIC HEART DISEASE AND OTHER DISEASES OF THE CIRCULATORY SYSTEM: Chronic | ICD-10-CM

## 2025-01-17 LAB — HCG UR QL: NEGATIVE — SIGNIFICANT CHANGE UP

## 2025-01-17 PROCEDURE — 81025 URINE PREGNANCY TEST: CPT

## 2025-01-17 PROCEDURE — 97161 PT EVAL LOW COMPLEX 20 MIN: CPT

## 2025-01-17 PROCEDURE — 88300 SURGICAL PATH GROSS: CPT | Mod: 26

## 2025-01-17 PROCEDURE — 88300 SURGICAL PATH GROSS: CPT

## 2025-01-17 PROCEDURE — 73630 X-RAY EXAM OF FOOT: CPT | Mod: 26,RT

## 2025-01-17 PROCEDURE — 20680 REMOVAL OF IMPLANT DEEP: CPT | Mod: RT

## 2025-01-17 PROCEDURE — 76000 FLUOROSCOPY <1 HR PHYS/QHP: CPT

## 2025-01-17 RX ORDER — ONDANSETRON 4 MG/1
4 TABLET ORAL ONCE
Refills: 0 | Status: DISCONTINUED | OUTPATIENT
Start: 2025-01-17 | End: 2025-01-17

## 2025-01-17 RX ORDER — APREPITANT 40 MG/1
40 CAPSULE ORAL ONCE
Refills: 0 | Status: COMPLETED | OUTPATIENT
Start: 2025-01-17 | End: 2025-01-17

## 2025-01-17 RX ORDER — ASPIRIN 81 MG
1 TABLET, DELAYED RELEASE (ENTERIC COATED) ORAL
Refills: 0 | DISCHARGE

## 2025-01-17 RX ORDER — HYDROMORPHONE HCL 4 MG
0.5 TABLET ORAL
Refills: 0 | Status: DISCONTINUED | OUTPATIENT
Start: 2025-01-17 | End: 2025-01-17

## 2025-01-17 RX ORDER — HYDROCODONE BITARTRATE AND ACETAMINOPHEN 10; 325 MG/1; MG/1
1 TABLET ORAL
Qty: 5 | Refills: 0
Start: 2025-01-17

## 2025-01-17 RX ORDER — ACETAMINOPHEN 80 MG/.8ML
2 SOLUTION/ DROPS ORAL
Refills: 0 | DISCHARGE

## 2025-01-17 RX ORDER — CEFAZOLIN SODIUM 1 G
2000 VIAL (EA) INJECTION ONCE
Refills: 0 | Status: COMPLETED | OUTPATIENT
Start: 2025-01-17 | End: 2025-01-17

## 2025-01-17 RX ORDER — OXYCODONE HCL 15 MG
5 TABLET ORAL ONCE
Refills: 0 | Status: DISCONTINUED | OUTPATIENT
Start: 2025-01-17 | End: 2025-01-17

## 2025-01-17 RX ORDER — SODIUM CHLORIDE 9 MG/ML
1000 INJECTION, SOLUTION INTRAVENOUS
Refills: 0 | Status: DISCONTINUED | OUTPATIENT
Start: 2025-01-17 | End: 2025-01-17

## 2025-01-17 RX ORDER — MECLIZINE HYDROCHLORIDE 25 MG/1
1 TABLET ORAL
Refills: 0 | DISCHARGE

## 2025-01-17 RX ORDER — B COMPLEX, C NO.20/FOLIC ACID 1 MG
1 CAPSULE ORAL
Refills: 0 | DISCHARGE

## 2025-01-17 RX ORDER — HYDROMORPHONE HCL 4 MG
1 TABLET ORAL
Refills: 0 | Status: DISCONTINUED | OUTPATIENT
Start: 2025-01-17 | End: 2025-01-17

## 2025-01-17 RX ORDER — CEFADROXIL 500 MG
1 CAPSULE ORAL
Qty: 6 | Refills: 0
Start: 2025-01-17 | End: 2025-01-19

## 2025-01-17 RX ORDER — CHLORHEXIDINE GLUCONATE 1.2 MG/ML
1 RINSE ORAL ONCE
Refills: 0 | Status: COMPLETED | OUTPATIENT
Start: 2025-01-17 | End: 2025-01-17

## 2025-01-17 RX ADMIN — CHLORHEXIDINE GLUCONATE 1 APPLICATION(S): 1.2 RINSE ORAL at 06:43

## 2025-01-17 RX ADMIN — Medication 1 MILLIGRAM(S): at 09:22

## 2025-01-17 RX ADMIN — SODIUM CHLORIDE 75 MILLILITER(S): 9 INJECTION, SOLUTION INTRAVENOUS at 09:08

## 2025-01-17 RX ADMIN — APREPITANT 40 MILLIGRAM(S): 40 CAPSULE ORAL at 06:43

## 2025-01-17 RX ADMIN — Medication 1 MILLIGRAM(S): at 09:08

## 2025-01-17 NOTE — ASU PATIENT PROFILE, ADULT - NS SC CAGE ALCOHOL GUILTY ABOUT
Patient presented with 6.4, repeat 6.1. Likely multifactorial i/s/o malignancy, CORBIN, and hematuria. FOBT (+)  discharge hgb > 8.4 s/p 1U pRBCs, repeat hgb >7.     Plan:   - plan to start lovenox today 11/28  - Transfusion threshold <7  - hold iron supplementation i/s/o infection   - avoid unnecessary blood draws   - T&S active (11/27) no

## 2025-01-17 NOTE — ASU DISCHARGE PLAN (ADULT/PEDIATRIC) - ASU DC SPECIAL INSTRUCTIONSFT
May walk with crutches and weight bearing as tolerated on right foot.   Elevate right foot on blankets above heart level (toes above the nose) for 1 hour 3 times a day.  Apply ice pack to right foot for 30 minutes every 3 hours daily.  Keep bandage clean and dry daily. May remove after 72 hours.  Cover right foot in shower daily with plastic bag & tape ( or cast bag ) to keep dry.  See the Surgeon in the office in 10 days.  Call the Surgeon for fever, fall or injury to operative leg, or severe ankle/calf pain.     - Call your doctor if you experience:  • An increase in pain not controlled by pain medication or change in activity or  position.  • Temperature greater than 101° F.  • Redness, increased swelling or foul smelling drainage from or around the  incision.  • Numbness, tingling or a change in color or temperature of the operative extremity.  • Call your doctor immediately if you experience chest pain, shortness of breath or calf pain.     Follow all verbal and written instructions. Take medications as prescribed. DO NOT drive, operate machinery, and/or make important decisions while on prescription pain medication. DO NOT hesitate to call Doctor's office with questions or concerns.     Pain medicine has been prescribed for you, as needed, and it often causes constipation.  Take docusate sodium (Colace) 100mg 3x daily, while taking narcotic pain medication.   For Constipation :   • Increase your water intake. Drink at least 8 glasses of water daily.  • Try adding fiber to your diet by eating fruits, vegetables and foods that are rich in grains.  • If you do experience constipation, you may take an over-the-counter laxative such as Senokot, Miralax or  Milk of Magnesia.

## 2025-01-17 NOTE — ASU PATIENT PROFILE, ADULT - AS SC BRADEN MOISTURE
Start Paxlovid. Monitor for any side effects or allergy. Hold Atorvastatin while taking Paxlovid. Do warm, salt water gargles 2-3 times daily. You can use Robitussin DM or Mucinex DM as directed on the bottle for cough and chest congestion. Use Cepacol for sore throat and dry cough as needed. Use Tylenol as needed for pain or fever. Stay hydrated. Go to ER or call 911 if worsening symptoms such as persistent fever >103, difficulty breathing, or chest pain. Return to clinic in one week as needed or when routine care is due. Infection prevention:  - Proper hand hygiene is very important          - Wear a mask in public  - Avoid touching your mouth, nose, eyes, and face  - Practice social distancing and staying 6 ft away from other individuals  - Cough into your arm or a tissue  - Avoid contact with others if you have symptoms of an upper or lower respiratory infection  - Avoid contact with others who are ill  - Avoid direct contact with your pets if you are ill  - Disinfect surfaces with appropriate materials  - If your symptoms become severe (shortness of breath with rest or activity, fever, chest congestion, productive cough), go to the ER  - Recommend self-isolation at home if you are sick, wearing a mask if in room with other family members    CDC Update: Isolation & Quarantine Guidelines for Patients - Updated 12/29/2021  Patients who are healthcare workers, follow your organization policy. If You Test Positive for COVID-19 (Isolate)    Everyone regardless of vaccination status:   Stay home for 5 days  If you have no symptoms or your symptoms are resolving after 5 days, you can leave your house, return to work, travel, etc.  Continue to wear a mask around others for 5 additional days. If you have a fever, continue to stay home until your fever resolves.       If You Were Exposed to Someone with COVID-19 Agustin Hernandez)    If you:               Have been boosted  OR  Completed the primary series of Pfizer or Moderna vaccine within the last 6 months  OR  Completed the primary series of J&J vaccine within the last 2 months    Wear a mask around others for 10 days. Test on day 5, if possible. If you develop symptoms get a test and stay home. If you:  Completed the primary series of Pfizer or Moderna vaccine over 6 months ago and are not boosted  OR  Completed the primary series of J&J over 2 months ago and are not boosted  OR  Are unvaccinated    Stay home for 5 days. After that continue to wear a mask around others for 5 additional days. If you can't quarantine you must wear a mask for 10 days. Test on day 5 if possible. If you develop symptoms get a test and stay home.     cdc.gov/coronavirus    10 THINGS YOU CAN DO TO MANAGE YOUR COVID-19 SYMPTOMS AT HOME    If you have possible or confirmed COVID-19    Stay home except to get medical care. Monitor your symptoms carefully. If your symptoms get worse, call your healthcare provider immediately. Get rest and stay hydrated. If you have a medical appointment, call the healthcare provider ahead of time and tell them you have or may have COVID-19. For medical emergencies, call 911 and notify the dispatch personnel that you have or may have COVID-19. Cover your cough and sneezes with a tissue or use the inside of your elbow. Wash your hands often with soap and water for at least 20 seconds or clean hands with an alcohol-based hand  that contains at least 60% alcohol. As much as possible, stay in a specific room and away from other people in your home. Also, you should use a separate bathroom, if available. If you need to be around other people in or outside of the home, wear a mask. Avoid sharing personal items with other people in your household, like dishes, towels, and bedding. Clean all surfaces that are touched often, like counters, tabletops, and doorknobs.  Use household cleaning sprays or wipes according to the label instructions. (4) rarely moist

## 2025-01-17 NOTE — ASU DISCHARGE PLAN (ADULT/PEDIATRIC) - FINANCIAL ASSISTANCE
[facility] provides services at a reduced cost to those who are determined to be eligible through [facility]’s financial assistance program. Information regarding [facility]’s financial assistance program can be found by going to [email or web address] or by calling [phone].

## 2025-01-17 NOTE — BRIEF OPERATIVE NOTE - NSICDXBRIEFPROCEDURE_GEN_ALL_CORE_FT
PROCEDURES:  Removal of hardware from metatarsal bone of right foot 17-Jan-2025 08:47:46  Estevan Amos

## 2025-01-17 NOTE — ASU DISCHARGE PLAN (ADULT/PEDIATRIC) - CALL YOUR DOCTOR IF YOU HAVE ANY OF THE FOLLOWING:
Swelling that gets worse/Pain not relieved by Medications/Fever greater than (need to indicate Fahrenheit or Celsius)/Wound/Surgical Site with redness, or foul smelling discharge or pus/Numbness, tingling, color or temperature change to extremity Bleeding that does not stop/Swelling that gets worse/Pain not relieved by Medications/Fever greater than (need to indicate Fahrenheit or Celsius)/Wound/Surgical Site with redness, or foul smelling discharge or pus/Numbness, tingling, color or temperature change to extremity

## 2025-01-17 NOTE — PHYSICAL THERAPY INITIAL EVALUATION ADULT - LEVEL OF INDEPENDENCE: SIT/STAND, REHAB EVAL
independent
Morphology Per Location (Optional): Speckled in color
Detail Level: Detailed
Size Of Lesion: 8 x 4 mm
Size Of Lesion: 8 mm

## 2025-01-17 NOTE — ASU DISCHARGE PLAN (ADULT/PEDIATRIC) - CARE PROVIDER_API CALL
Herman Parker  Orthopaedic Surgery  [address] [place] [address]  Phone: [phone]  Fax: [phone]  Follow Up Time:

## 2025-01-21 ENCOUNTER — APPOINTMENT (OUTPATIENT)
Dept: ORTHOPEDIC SURGERY | Facility: CLINIC | Age: 50
End: 2025-01-21
Payer: COMMERCIAL

## 2025-01-21 PROBLEM — Z86.16 PERSONAL HISTORY OF COVID-19: Chronic | Status: ACTIVE | Noted: 2025-01-09

## 2025-01-21 PROBLEM — G43.909 MIGRAINE, UNSPECIFIED, NOT INTRACTABLE, WITHOUT STATUS MIGRAINOSUS: Chronic | Status: ACTIVE | Noted: 2025-01-09

## 2025-01-21 PROBLEM — Z87.898 PERSONAL HISTORY OF OTHER SPECIFIED CONDITIONS: Chronic | Status: ACTIVE | Noted: 2025-01-09

## 2025-01-21 PROBLEM — R42 DIZZINESS AND GIDDINESS: Chronic | Status: ACTIVE | Noted: 2025-01-09

## 2025-01-21 PROBLEM — S93.311A: Chronic | Status: ACTIVE | Noted: 2025-01-09

## 2025-01-21 PROBLEM — Z92.29 PERSONAL HISTORY OF OTHER DRUG THERAPY: Chronic | Status: ACTIVE | Noted: 2025-01-09

## 2025-01-21 PROBLEM — M75.51 BURSITIS OF RIGHT SHOULDER: Chronic | Status: ACTIVE | Noted: 2025-01-09

## 2025-01-21 PROBLEM — S92.901A UNSPECIFIED FRACTURE OF RIGHT FOOT, INITIAL ENCOUNTER FOR CLOSED FRACTURE: Chronic | Status: ACTIVE | Noted: 2025-01-09

## 2025-01-21 PROCEDURE — 99024 POSTOP FOLLOW-UP VISIT: CPT

## 2025-01-21 RX ORDER — DOXYCYCLINE HYCLATE 100 MG/1
100 TABLET ORAL
Qty: 20 | Refills: 0 | Status: ACTIVE | COMMUNITY
Start: 2025-01-21 | End: 1900-01-01

## 2025-01-27 ENCOUNTER — APPOINTMENT (OUTPATIENT)
Dept: ORTHOPEDIC SURGERY | Facility: CLINIC | Age: 50
End: 2025-01-27
Payer: COMMERCIAL

## 2025-01-27 VITALS — WEIGHT: 162 LBS | BODY MASS INDEX: 25.43 KG/M2 | HEIGHT: 67 IN

## 2025-01-27 DIAGNOSIS — S93.321D: ICD-10-CM

## 2025-01-27 PROCEDURE — 99024 POSTOP FOLLOW-UP VISIT: CPT

## 2025-01-27 PROCEDURE — 73630 X-RAY EXAM OF FOOT: CPT | Mod: RT

## 2025-02-10 ENCOUNTER — APPOINTMENT (OUTPATIENT)
Dept: ORTHOPEDIC SURGERY | Facility: CLINIC | Age: 50
End: 2025-02-10
Payer: COMMERCIAL

## 2025-02-10 VITALS — HEIGHT: 67 IN | BODY MASS INDEX: 25.43 KG/M2 | WEIGHT: 162 LBS

## 2025-02-10 DIAGNOSIS — S93.321D: ICD-10-CM

## 2025-02-10 PROCEDURE — 99024 POSTOP FOLLOW-UP VISIT: CPT

## 2025-03-24 ENCOUNTER — APPOINTMENT (OUTPATIENT)
Dept: ORTHOPEDIC SURGERY | Facility: CLINIC | Age: 50
End: 2025-03-24

## 2025-04-23 ENCOUNTER — APPOINTMENT (OUTPATIENT)
Dept: ORTHOPEDIC SURGERY | Facility: CLINIC | Age: 50
End: 2025-04-23
Payer: COMMERCIAL

## 2025-04-23 VITALS — WEIGHT: 169 LBS | BODY MASS INDEX: 26.53 KG/M2 | HEIGHT: 67 IN

## 2025-04-23 DIAGNOSIS — M19.011 PRIMARY OSTEOARTHRITIS, RIGHT SHOULDER: ICD-10-CM

## 2025-04-23 DIAGNOSIS — M50.90 CERVICAL DISC DISORDER, UNSPECIFIED, UNSPECIFIED CERVICAL REGION: ICD-10-CM

## 2025-04-23 DIAGNOSIS — M79.18 MYALGIA, OTHER SITE: ICD-10-CM

## 2025-04-23 DIAGNOSIS — M75.41 IMPINGEMENT SYNDROME OF RIGHT SHOULDER: ICD-10-CM

## 2025-04-23 DIAGNOSIS — M54.12 RADICULOPATHY, CERVICAL REGION: ICD-10-CM

## 2025-04-23 PROCEDURE — 73010 X-RAY EXAM OF SHOULDER BLADE: CPT | Mod: RT

## 2025-04-23 PROCEDURE — 20611 DRAIN/INJ JOINT/BURSA W/US: CPT | Mod: RT

## 2025-04-23 PROCEDURE — 99213 OFFICE O/P EST LOW 20 MIN: CPT | Mod: 25

## 2025-04-23 PROCEDURE — 99214 OFFICE O/P EST MOD 30 MIN: CPT | Mod: 25

## 2025-04-23 PROCEDURE — J3490M: CUSTOM

## 2025-04-23 PROCEDURE — 73030 X-RAY EXAM OF SHOULDER: CPT | Mod: RT

## 2025-05-16 ENCOUNTER — APPOINTMENT (OUTPATIENT)
Dept: PAIN MANAGEMENT | Facility: CLINIC | Age: 50
End: 2025-05-16

## 2025-07-01 ENCOUNTER — APPOINTMENT (OUTPATIENT)
Dept: ORTHOPEDIC SURGERY | Facility: CLINIC | Age: 50
End: 2025-07-01
Payer: COMMERCIAL

## 2025-07-01 VITALS — WEIGHT: 174 LBS | BODY MASS INDEX: 27.31 KG/M2 | HEIGHT: 67 IN

## 2025-07-01 PROCEDURE — 72040 X-RAY EXAM NECK SPINE 2-3 VW: CPT

## 2025-07-01 PROCEDURE — 20552 NJX 1/MLT TRIGGER POINT 1/2: CPT

## 2025-07-01 PROCEDURE — J3490M: CUSTOM

## 2025-07-01 PROCEDURE — 99214 OFFICE O/P EST MOD 30 MIN: CPT | Mod: 25

## 2025-07-04 ENCOUNTER — APPOINTMENT (OUTPATIENT)
Dept: MRI IMAGING | Facility: CLINIC | Age: 50
End: 2025-07-04
Payer: COMMERCIAL

## 2025-07-04 PROCEDURE — 72141 MRI NECK SPINE W/O DYE: CPT

## 2025-07-18 ENCOUNTER — APPOINTMENT (OUTPATIENT)
Dept: PAIN MANAGEMENT | Facility: CLINIC | Age: 50
End: 2025-07-18
Payer: COMMERCIAL

## 2025-07-18 VITALS — HEIGHT: 67 IN | BODY MASS INDEX: 28.25 KG/M2 | WEIGHT: 180 LBS

## 2025-07-18 PROCEDURE — 99214 OFFICE O/P EST MOD 30 MIN: CPT

## 2025-08-04 ENCOUNTER — APPOINTMENT (OUTPATIENT)
Dept: PAIN MANAGEMENT | Facility: CLINIC | Age: 50
End: 2025-08-04
Payer: COMMERCIAL

## 2025-08-04 PROCEDURE — 62321 NJX INTERLAMINAR CRV/THRC: CPT

## 2025-08-18 ENCOUNTER — APPOINTMENT (OUTPATIENT)
Dept: PAIN MANAGEMENT | Facility: CLINIC | Age: 50
End: 2025-08-18
Payer: COMMERCIAL

## 2025-08-18 VITALS — BODY MASS INDEX: 28.25 KG/M2 | HEIGHT: 67 IN | WEIGHT: 180 LBS

## 2025-08-18 DIAGNOSIS — M54.12 RADICULOPATHY, CERVICAL REGION: ICD-10-CM

## 2025-08-18 PROCEDURE — 99213 OFFICE O/P EST LOW 20 MIN: CPT

## 2025-09-11 ENCOUNTER — APPOINTMENT (OUTPATIENT)
Dept: PAIN MANAGEMENT | Facility: CLINIC | Age: 50
End: 2025-09-11
Payer: COMMERCIAL

## 2025-09-11 DIAGNOSIS — M54.12 RADICULOPATHY, CERVICAL REGION: ICD-10-CM

## 2025-09-11 PROCEDURE — 81025 URINE PREGNANCY TEST: CPT

## 2025-09-11 PROCEDURE — 62321 NJX INTERLAMINAR CRV/THRC: CPT

## (undated) DEVICE — WARMING BLANKET FULL ADULT

## (undated) DEVICE — DRAPE 3/4 SHEET 52X76"

## (undated) DEVICE — DRSG STOCKINETTE CLOTH 6 X 72"

## (undated) DEVICE — SUT MONOCRYL 4-0 18" P-3 UNDYED

## (undated) DEVICE — VENODYNE/SCD SLEEVE CALF MEDIUM

## (undated) DEVICE — DRAPE IOBAN 23" X 23"

## (undated) DEVICE — DRSG CURITY GAUZE SPONGE 4 X 4" 12-PLY

## (undated) DEVICE — ELCTR BOVIE TIP BLADE INSULATED 2.75" EDGE

## (undated) DEVICE — DRSG COBAN 6"

## (undated) DEVICE — SUT VICRYL PLUS 3-0 27" RB-1 UNDYED

## (undated) DEVICE — GLV 7.5 PROTEXIS (BLUE)

## (undated) DEVICE — DRSG STERISTRIPS 0.5 X 4"

## (undated) DEVICE — DRSG WEBRIL 6"

## (undated) DEVICE — DRSG XEROFORM 1 X 8"

## (undated) DEVICE — DRSG ACE BANDAGE 6"

## (undated) DEVICE — DRSG COMBINE 5 X 9"

## (undated) DEVICE — ELCTR STRYKER NEPTUNE SMOKE EVACUATION PENCIL (GREEN)

## (undated) DEVICE — WARMING BLANKET UPPER ADULT

## (undated) DEVICE — DRAPE C ARM C-ARMOUR

## (undated) DEVICE — PACK LOWER EXTREMITY

## (undated) DEVICE — DRSG WEBRIL 4"